# Patient Record
Sex: FEMALE | Race: OTHER | URBAN - METROPOLITAN AREA
[De-identification: names, ages, dates, MRNs, and addresses within clinical notes are randomized per-mention and may not be internally consistent; named-entity substitution may affect disease eponyms.]

---

## 2018-02-03 ENCOUNTER — OFFICE VISIT (OUTPATIENT)
Dept: FAMILY MEDICINE CLINIC | Facility: CLINIC | Age: 53
End: 2018-02-03

## 2018-02-03 VITALS
OXYGEN SATURATION: 98 % | WEIGHT: 160 LBS | HEART RATE: 89 BPM | TEMPERATURE: 99 F | BODY MASS INDEX: 28.35 KG/M2 | SYSTOLIC BLOOD PRESSURE: 130 MMHG | DIASTOLIC BLOOD PRESSURE: 74 MMHG | HEIGHT: 63 IN | RESPIRATION RATE: 16 BRPM

## 2018-02-03 DIAGNOSIS — L03.213 PRESEPTAL CELLULITIS OF LEFT UPPER EYELID: Primary | ICD-10-CM

## 2018-02-03 PROCEDURE — 99202 OFFICE O/P NEW SF 15 MIN: CPT | Performed by: NURSE PRACTITIONER

## 2018-02-03 RX ORDER — CLINDAMYCIN HYDROCHLORIDE 300 MG/1
300 CAPSULE ORAL 3 TIMES DAILY
Qty: 30 CAPSULE | Refills: 0 | Status: SHIPPED | OUTPATIENT
Start: 2018-02-03 | End: 2018-02-13

## 2018-02-03 RX ORDER — BACITRACIN 500 [USP'U]/G
1 OINTMENT OPHTHALMIC 4 TIMES DAILY
Qty: 1 BOTTLE | Refills: 0 | Status: SHIPPED | OUTPATIENT
Start: 2018-02-03 | End: 2018-02-10

## 2018-02-03 NOTE — PROGRESS NOTES
CHIEF COMPLAINT:   Patient presents with:  Eye Problem: left eye, X 3 days, worsening. HPI:   Tamera Barbosa is a 47year old female who presents with chief complaint of left eye swelling Symptoms began  3  days ago.  Symptoms have been worsned since onset GENERAL: well developed, well nourished,in no apparent distress  SKIN: no rashes,no suspicious lesions, Left eye: Upper eyelid erythematous and swollen, No pain with palpitation. No discharge noted. Under left eye, swelling noted but no erythema.  No streak Please follow up with opthalmology on Monday 2/5/2018, if worsening before this go to ER immediately. Do not use your contacts while using this medication and for at least one week afterward.      Periorbital Cellulitis  Periorbital cellulitis is an inf Cellulitis is an infection of the deep layers of skin. A break in the skin, such as a cut or scratch, can let bacteria under the skin. It may also occur from an infected oil gland (pimple) or hair follicle.  If the bacteria get to deep layers of the skin, i · Headache or neck pain that gets worse  · Unusual drowsiness or confusion  · Convulsions (seizure)  · Change in eyesight     Date Last Reviewed: 9/1/2016  © 3492-0866 [employer]. [address] [facility] [place] [address] [place] [address].  All rights reserve What may interact with this medicine? · birth control pills  · erythromycin  · medicines that relax muscles for surgery  · rifampin  What if I miss a dose? If you miss a dose, take it as soon as you can.  If it is almost time for your next dose, take only This medicine is only for use in the eye. Follow the directions on the prescription label. Wash hands before and after use. Try not to touch the tip of the tube to anything, even your eye or fingertips. Tilt your head back slightly and pull your lower eyeli Store at room temperature between 15 and 30 degrees C (59 and 86 degrees F). Protect from light. Throw away any unused medicine after the expiration date. What should I tell my health care provider before I take this medicine?   They need to know if you ha

## 2018-02-04 ENCOUNTER — TELEPHONE (OUTPATIENT)
Dept: FAMILY MEDICINE CLINIC | Facility: CLINIC | Age: 53
End: 2018-02-04

## 2018-02-04 NOTE — TELEPHONE ENCOUNTER
[facility] recheck. Left voice mail (OK per FYI/HIPPA) asking patient to call ([phone]) for update on patient's condition.

## 2018-06-06 ENCOUNTER — OFFICE VISIT (OUTPATIENT)
Dept: FAMILY MEDICINE CLINIC | Facility: CLINIC | Age: 53
End: 2018-06-06

## 2018-06-06 VITALS
SYSTOLIC BLOOD PRESSURE: 119 MMHG | DIASTOLIC BLOOD PRESSURE: 80 MMHG | TEMPERATURE: 98 F | HEIGHT: 63 IN | BODY MASS INDEX: 32.78 KG/M2 | WEIGHT: 185 LBS | HEART RATE: 82 BPM | RESPIRATION RATE: 14 BRPM

## 2018-06-06 DIAGNOSIS — H60.502 ACUTE OTITIS EXTERNA OF LEFT EAR, UNSPECIFIED TYPE: Primary | ICD-10-CM

## 2018-06-06 PROCEDURE — 99213 OFFICE O/P EST LOW 20 MIN: CPT | Performed by: NURSE PRACTITIONER

## 2018-06-06 RX ORDER — AMOXICILLIN AND CLAVULANATE POTASSIUM 875; 125 MG/1; MG/1
1 TABLET, FILM COATED ORAL 2 TIMES DAILY
Qty: 14 TABLET | Refills: 0 | Status: SHIPPED | OUTPATIENT
Start: 2018-06-06 | End: 2018-06-13

## 2018-06-06 RX ORDER — CIPROFLOXACIN AND DEXAMETHASONE 3; 1 MG/ML; MG/ML
4 SUSPENSION/ DROPS AURICULAR (OTIC) 2 TIMES DAILY
Qty: 1 BOTTLE | Refills: 0 | Status: SHIPPED | OUTPATIENT
Start: 2018-06-06 | End: 2018-06-13

## 2018-06-06 NOTE — PROGRESS NOTES
CHIEF COMPLAINT:   Patient presents with:  Ear Pain      HPI:   Gauri Rosas is a 47year old female who presents ear complaints for  2 days. Symptoms started after scratching ear d/t itch. Location is left ear and jaw.   Symptoms are described as tender with CARDIOVASCULAR:patient  denies chest pain or palpitations   MUSCULOSKELETAL: patient denies any osseous, soft tissue, or joint complaints. NEURO: Denies headaches, numbness, or change in balane.     EXAM:   /80   Pulse 82   Temp 98.4 °F (36.9 °C) Patient instructed to keep ears dry for 7 to 14 days. Patient instructed not use any OTC medicatiuon/home remedies while taking medication. Patient instructed to not use Q-Tips in now or in future.     Meds & Refills for this Visit:    Signed Prescription · You may use acetaminophen or ibuprofen to control pain, unless another medicine was prescribed.  Note: If you have chronic liver or kidney disease or ever had a stomach ulcer or GI bleeding, talk to your healthcare provider before taking any of these medi

## 2024-09-07 NOTE — PATIENT INSTRUCTIONS
Please follow up with opthalmology on Monday 2/5/2018, if worsening before this go to ER immediately. Do not use your contacts while using this medication and for at least one week afterward.      Periorbital Cellulitis  Periorbital cellulitis is an inf Subjective     Deandra is here with her mother for cough.    Cough for two weeks, worsening last night.  Acute change to honking, barking cough that kept her up all night.  Nasal congestion, no fever, otherwise well.    Objective     Temp 37.2 °C (98.9 °F)   Wt 20 kg       General:  Well-appearing  Well-hydrated  No acute distress   Eyes:  Lids:  normal  Conjunctivae:  normal   ENT:  Ears:  RTM: normal           LTM:  normal  Nose:  nasal secretions  Mouth:  mucosa moist; no visible lesions  Throat:  OP moist and clear; uvula midline  Neck:  supple   Respiratory:  Respiratory rate:  normal  Air exchange:  normal   Adventitious breath sounds:  none  Accessory muscle use:  none   Heart:  Rate and rhythm:  regular  Murmur:  none    GI: Deferred   Skin:  Warm and well-perfused  No rashes apparent   Lymphatic: Shotty, NT cervical nodes  No nodes larger than 1 cm palpated  No firm or fixed nodes palpated           Assessment/Plan       Deandra is well-appearing, well-hydrated, in no acute distress, and afebrile at today's visit.    Her history of illness, clinical presentation, and examination indicates the diagnosis of viral illness, croup    Prednisone x 3 days    Supportive care measures and expected course of illness reviewed.    Follow up promptly for worsening or prolonged illness.     Cellulitis is an infection of the deep layers of skin. A break in the skin, such as a cut or scratch, can let bacteria under the skin. It may also occur from an infected oil gland (pimple) or hair follicle.  If the bacteria get to deep layers of the skin, i · Headache or neck pain that gets worse  · Unusual drowsiness or confusion  · Convulsions (seizure)  · Change in eyesight     Date Last Reviewed: 9/1/2016  © 0466-5507 [employer]. [address] [facility] [place] [address] [place] [address].  All rights reserve What may interact with this medicine? · birth control pills  · erythromycin  · medicines that relax muscles for surgery  · rifampin  What if I miss a dose? If you miss a dose, take it as soon as you can.  If it is almost time for your next dose, take only This medicine is only for use in the eye. Follow the directions on the prescription label. Wash hands before and after use. Try not to touch the tip of the tube to anything, even your eye or fingertips. Tilt your head back slightly and pull your lower eyeli Store at room temperature between 15 and 30 degrees C (59 and 86 degrees F). Protect from light. Throw away any unused medicine after the expiration date. What should I tell my health care provider before I take this medicine?   They need to know if you ha